# Patient Record
Sex: MALE | Race: WHITE
[De-identification: names, ages, dates, MRNs, and addresses within clinical notes are randomized per-mention and may not be internally consistent; named-entity substitution may affect disease eponyms.]

---

## 2017-04-27 ENCOUNTER — HOSPITAL ENCOUNTER (OUTPATIENT)
Dept: HOSPITAL 47 - RADUSMAIN | Age: 47
Discharge: HOME | End: 2017-04-27
Payer: COMMERCIAL

## 2017-04-27 DIAGNOSIS — R10.84: Primary | ICD-10-CM

## 2017-04-27 PROCEDURE — 76705 ECHO EXAM OF ABDOMEN: CPT

## 2017-04-27 PROCEDURE — 74240 X-RAY XM UPR GI TRC 1CNTRST: CPT

## 2017-04-27 NOTE — FL
EXAMINATION TYPE: FL UGI

 

DATE OF EXAM: 4/27/2017 10:15 AM

 

COMPARISON: NONE

 

HISTORY: Epigastric pain

 

TECHNIQUE:  A double air contrast UGI study is performed.

 

FINDINGS: Esophagus dilates to normal caliber has normal contour to the gastroesophageal junction. Ga
stroesophageal junction has hesitancy opening, but appears to open to a normal caliber. There is inco
mplete stripping of the esophageal bolus with a couple small tertiary contractions observed as well a
s a secondary contraction in the horizontal drinking position.

 

There is abundant fluid and debris within the stomach despite following the preparation for the exami
nation. An obvious abnormal intraluminal abnormality is not identified. Smaller abnormalities includi
ng small ulcers may not be visualized.

 

There is marked delay of contrast entering the duodenum. Duodenum is inadequately evaluated despite m
ultiple delayed images. During one of the initial fluoroscopic spot images there is a suggestion of a
n ulcer within the lesser curvature of the antrum or within the duodenum bulb. This could not be repr
oduced. Nearly an hour following the beginning of the examination is small amount contrast may be wit
hin the second portion of the duodenum. Subsequent delayed film suggests some contrast within the pro
ximal small bowel loops. Again no good visualization of the duodenum is apparent.

 

IMPRESSIONS:

1. There appears to be a severe outlet obstruction at the pylorus. Consider pyloric channel ulcer. Ad
ditional workup is recommended.

2. Some spasm or hesitancy passing to the gastroesophageal junction.

3. Mild presbyesophagus.

4. Significant retained debris and fluid within the stomach during this examination causing limitatio
n.

5. Possible ulcer within the antrum of the stomach or possibly within the duodenal bulb. This is not 
reproduced on additional on subsequent images.

## 2017-04-27 NOTE — US
EXAMINATION TYPE: US abdomen limited

 

DATE OF EXAM: 4/27/2017 8:32 AM

 

COMPARISON: NONE

 

CLINICAL HISTORY: 46-year-old male with R10.84 abdominal pain.

 

TECHNIQUE: Multiple sonographic images of the right upper quadrant are obtained.

 

FINDINGS:

 

Liver Length:  16.6 cm   

Gallbladder Wall:  0.6 cm   

CBD:  .4 cm

Right Kidney:  11.0 x 5.0 x 5.9 cm

 

 

Pancreas:  Obscured by bowel gas

Liver: There appears to be some attenuation due to patient large body habitus. The overall appearance
 of the liver is relatively normal without focal lesion.

Gallbladder:  Collapsed completely thickened wall probably due to nondistention. No shadowing calculi
 or pericholecystic fluid.

**Evidence for sonographic Kapoor's sign:  no

CBD:  Within normal limits. 

Right Kidney:  No hydronephrosis.

 

 

IMPRESSION: 

Gallbladder wall thickening likely due to contracted state. No cholelithiasis or ancillary findings o
f acute cholecystitis.

## 2017-08-04 ENCOUNTER — HOSPITAL ENCOUNTER (OUTPATIENT)
Dept: HOSPITAL 47 - RADUSWWP | Age: 47
Discharge: HOME | End: 2017-08-04
Payer: COMMERCIAL

## 2017-08-04 DIAGNOSIS — R33.9: Primary | ICD-10-CM

## 2017-08-04 PROCEDURE — 76857 US EXAM PELVIC LIMITED: CPT

## 2017-08-04 NOTE — US
EXAMINATION TYPE: US bladder

 

DATE OF EXAM: 8/4/2017

 

COMPARISON: NONE

 

CLINICAL HISTORY: Urinary retention R33.9.

 

EXAM MEASUREMENTS:

 

Post Void Residual Volume:  27.2 mL

 

Color Doppler performed to assess ureteral jets.

** Bilateral Jets seen:  only left jet seen in 3 minutes of scan time

 

** Normal Post Void Residual (less than 50ml):  yes, at 27.2 ml

 

Urinary bladder walls appear smooth without thickening.

 

IMPRESSION:  Small amount of post void residual measuring 27 mm, within normal limits.

## 2017-08-30 ENCOUNTER — HOSPITAL ENCOUNTER (OUTPATIENT)
Dept: HOSPITAL 47 - RADCTMAIN | Age: 47
Discharge: HOME | End: 2017-08-30
Payer: COMMERCIAL

## 2017-08-30 DIAGNOSIS — K56.41: Primary | ICD-10-CM

## 2017-08-30 LAB
BUN SERPL-SCNC: 15 MG/DL (ref 9–20)
NON-AFRICAN AMERICAN GFR(MDRD): >60

## 2017-08-30 PROCEDURE — 36415 COLL VENOUS BLD VENIPUNCTURE: CPT

## 2017-08-30 PROCEDURE — 84520 ASSAY OF UREA NITROGEN: CPT

## 2017-08-30 PROCEDURE — 82565 ASSAY OF CREATININE: CPT

## 2017-08-30 PROCEDURE — 74160 CT ABDOMEN W/CONTRAST: CPT

## 2017-08-30 NOTE — CT
EXAMINATION TYPE: CT abdomen w con

 

DATE OF EXAM: 8/30/2017

 

COMPARISON: NONE

 

HISTORY: Generalized abdominal pain x 4 months.

 

CT DLP: 994.50 mGycm, Automated Exposure Control for Dose Reduction was Utilized.

 

CONTRAST: 

CT scan of the abdomen is performed with oral and with IV Contrast, patient injected with 100 mL of O
mnipaque 300.

 

FINDINGS:

 

LUNG BASES:  No significant abnormality is appreciated.

 

LIVER/GB:   No significant abnormality is appreciated.

 

PANCREAS:  No significant abnormality is seen.

 

SPLEEN:  No significant abnormality is seen.

 

ADRENALS:  No significant abnormality is seen.

 

KIDNEYS:  No significant abnormality is seen.

 

BOWEL: The oral contrast only reaches distal jejunal loops in the left abdomen. There is no suspiciou
s small or large bowel dilatation. There is some prominence of fecal material in the right colon and 
to lesser degree the transverse colon.

 

LYMPH NODES:  No greater than 1cm abdominal lymph nodes are appreciated.

 

OSSEOUS STRUCTURES: There is postsurgical change with posterior fusion hardware at L4-S1 levels bilat
erally. There are bilateral laminectomy defects and spinous process resection in the lower lumbar spi
ne. There is disc space narrowing L5-S1 level. Facet arthropathy in disc herniation surgery 2 spinal 
canal effacement or stenosis L3-L4 level on axial image 52.

 

OTHER: There is moderate mixed plaque in the abdominal aorta extending into visualized pelvic branch 
vessels.

 

IMPRESSION:  Fairly moderate proximal colonic fecal stasis otherwise no significant acute finding is 
seen to account for patient's clinical symptoms.

## 2018-04-13 ENCOUNTER — HOSPITAL ENCOUNTER (OUTPATIENT)
Dept: HOSPITAL 47 - LABWHC1 | Age: 48
Discharge: HOME | End: 2018-04-13
Attending: PODIATRIST
Payer: COMMERCIAL

## 2018-04-13 DIAGNOSIS — Z53.9: Primary | ICD-10-CM

## 2019-07-25 ENCOUNTER — HOSPITAL ENCOUNTER (OUTPATIENT)
Dept: HOSPITAL 47 - RADUSWWP | Age: 49
Discharge: HOME | End: 2019-07-25
Payer: COMMERCIAL

## 2019-07-25 DIAGNOSIS — R11.0: ICD-10-CM

## 2019-07-25 DIAGNOSIS — R10.84: Primary | ICD-10-CM

## 2019-07-25 PROCEDURE — 76700 US EXAM ABDOM COMPLETE: CPT

## 2019-07-25 NOTE — US
EXAMINATION TYPE: US abdomen complete

 

DATE OF EXAM: 7/25/2019

 

COMPARISON: NONE

 

CLINICAL HISTORY: R10.84 Abd pain, R11.0 Nausea. abd pain for a few weeks, diabetic, patient states 7
-10lb weight loss per week 

 

EXAM MEASUREMENTS:

 

Liver Length:  17.1 cm   

Gallbladder Wall:  0.2 cm   

CBD:  0.6 cm

Spleen:  10.2 cm   

Right Kidney:  10.2 x 4.8 x 5.4 cm 

Left Kidney:  10.1 x 4.1 x 5.5 cm   

 

**bowel gas obscures imaging, patient chewing gum and in noticeable pain during exam within epigastri
c area 

 

Pancreas:  not seen due to gas

Liver:  intercostal views only due to gas appear wnl  

Gallbladder:  wnl

**Evidence for sonographic Kapoor's sign:  no

CBD:  wnl 

Spleen:  wnl   

Right Kidney:  wnl   

Left Kidney:  wnl   

Upper IVC:  wnl  

Abd Aorta:  limited views appear wnl

 

The liver is homogenous.  The intrahepatic portion of the IVC and proximal abdominal aorta are within
 normal limits.  There is no evidence of cholelithiasis.  Common bile duct is unremarkable.  The visu
alized portions of the pancreas are homogenous.  The spleen is unremarkable.  Kidneys are symmetric a
nd free of hydronephrosis.  No renal lesions are seen.

 

IMPRESSION: Unremarkable exam. No sonographic evidence of cholelithiasis nor acute cholecystitis. The
 patient notes epigastric pain during the examination.

## 2020-06-05 ENCOUNTER — HOSPITAL ENCOUNTER (OUTPATIENT)
Dept: HOSPITAL 47 - LABWHC1 | Age: 50
End: 2020-06-05
Attending: INTERNAL MEDICINE
Payer: COMMERCIAL

## 2020-06-05 DIAGNOSIS — Z11.59: Primary | ICD-10-CM

## 2020-06-10 ENCOUNTER — HOSPITAL ENCOUNTER (OUTPATIENT)
Dept: HOSPITAL 47 - ORWHC2ENDO | Age: 50
Discharge: HOME | End: 2020-06-10
Attending: INTERNAL MEDICINE
Payer: COMMERCIAL

## 2020-06-10 VITALS — DIASTOLIC BLOOD PRESSURE: 86 MMHG | SYSTOLIC BLOOD PRESSURE: 122 MMHG | HEART RATE: 100 BPM

## 2020-06-10 VITALS — TEMPERATURE: 97.6 F | RESPIRATION RATE: 16 BRPM

## 2020-06-10 VITALS — BODY MASS INDEX: 26.4 KG/M2

## 2020-06-10 DIAGNOSIS — Z79.899: ICD-10-CM

## 2020-06-10 DIAGNOSIS — Z87.891: ICD-10-CM

## 2020-06-10 DIAGNOSIS — E78.5: ICD-10-CM

## 2020-06-10 DIAGNOSIS — E11.9: ICD-10-CM

## 2020-06-10 DIAGNOSIS — Z12.11: Primary | ICD-10-CM

## 2020-06-10 DIAGNOSIS — Z80.0: ICD-10-CM

## 2020-06-10 DIAGNOSIS — Z79.82: ICD-10-CM

## 2020-06-10 DIAGNOSIS — F41.9: ICD-10-CM

## 2020-06-10 DIAGNOSIS — Z79.84: ICD-10-CM

## 2020-06-10 LAB — GLUCOSE BLD-MCNC: 119 MG/DL (ref 75–99)

## 2020-06-10 NOTE — P.PCN
Date of Procedure: 06/10/20


Procedure(s) Performed: 


BRIEF HISTORY: Patient is a 50-year-old pleasant white male scheduled for an 

elective colonoscopy as a part of screening for colorectal neoplasia.  His 

father was diagnosed with colon cancer at age 62.





PROCEDURE PERFORMED: Colonoscopy. 





PREOPERATIVE DIAGNOSIS: Screening for colon cancer/family history of colon 

cancer. 





IV sedation per Anesthesia. 





PROCEDURE: After informed consent was obtained, the patient, was brought into 

the endoscopy unit. IV sedation was administered by Anesthesia under continuous 

monitoring.  Digital rectal examination was normal. Initially the Olympus CF-160

flexible video colonoscope was then inserted in the rectum, gradually advanced 

into the cecum without any difficulty. Careful examination was performed as the 

scope was gradually being withdrawn. Ileocecal valve and the appendiceal orifice

were visualized and appeared normal.  Prep was poor and several areas of the 

colon. Mucosa of the cecum, ascending colon, transverse colon, descending colon,

sigmoid colon, and rectum appeared normal. Retroflexion was performed in the 

rectum and no lesions were seen. The patient tolerated the procedure well. 





IMPRESSION: Normal-appearing colon from rectum to cecum with no evidence of 

colorectal neoplasia.





RECOMMENDATIONS:  Findings of this examination were discussed with the patient 

is well as his family.  He was advised to have a repeat screening colonoscopy 

every 5 years because of the family history of colon cancer.